# Patient Record
Sex: FEMALE | Race: WHITE | NOT HISPANIC OR LATINO | Employment: UNEMPLOYED | URBAN - METROPOLITAN AREA
[De-identification: names, ages, dates, MRNs, and addresses within clinical notes are randomized per-mention and may not be internally consistent; named-entity substitution may affect disease eponyms.]

---

## 2022-12-14 ENCOUNTER — SOCIAL WORK (OUTPATIENT)
Dept: ADMINISTRATIVE | Facility: OTHER | Age: 22
End: 2022-12-14

## 2022-12-14 NOTE — PROGRESS NOTES
SW met with pt regarding initial OB assessment. Pt stated this is her 4th pregnancy/0-miscarriage. Pt stated lives with her fiancee/children-4,3 and able to perform ADL's independently. Pt stated does not work. Pt stated support system is her bebeto/[employer]iel. Pt stated has medicaid([place]). Pt stated does not have WIC. Pt stated is going to breastfeed. SW provide pt with information on other community resources.SW faxed and scanned pt's notification of pregnancy into epic.  No other needs identified at this time.    Tova Snyder,MSW  Pager#[phone]

## 2023-01-01 PROBLEM — O26.893 HEARTBURN DURING PREGNANCY IN THIRD TRIMESTER: Status: ACTIVE | Noted: 2023-01-01

## 2023-01-01 PROBLEM — R12 HEARTBURN DURING PREGNANCY IN THIRD TRIMESTER: Status: ACTIVE | Noted: 2023-01-01

## 2023-01-01 PROBLEM — R10.9 ABDOMINAL PAIN AFFECTING PREGNANCY: Status: ACTIVE | Noted: 2023-01-01

## 2023-01-01 PROBLEM — O26.899 ABDOMINAL PAIN AFFECTING PREGNANCY: Status: ACTIVE | Noted: 2023-01-01

## 2023-01-04 PROBLEM — Z34.93 ENCOUNTER FOR SUPERVISION OF NORMAL PREGNANCY IN THIRD TRIMESTER: Status: ACTIVE | Noted: 2023-01-04

## 2023-01-04 PROBLEM — O99.810 HYPERGLYCEMIA IN PREGNANCY: Status: ACTIVE | Noted: 2023-01-04

## 2023-01-04 PROBLEM — D64.9 ANEMIA: Status: ACTIVE | Noted: 2023-01-04

## 2023-01-11 PROBLEM — O24.415 GESTATIONAL DIABETES MELLITUS (GDM) IN THIRD TRIMESTER CONTROLLED ON ORAL HYPOGLYCEMIC DRUG: Status: ACTIVE | Noted: 2023-01-11

## 2023-01-11 PROBLEM — Z87.59 HISTORY OF THIRD DEGREE PERINEAL LACERATION: Status: ACTIVE | Noted: 2023-01-11

## 2023-01-11 PROBLEM — Z87.59 HISTORY OF SHOULDER DYSTOCIA IN PRIOR PREGNANCY: Status: ACTIVE | Noted: 2023-01-11

## 2023-01-20 PROBLEM — O41.1230 CHORIOAMNIONITIS IN THIRD TRIMESTER: Status: ACTIVE | Noted: 2023-01-20

## 2023-01-21 PROBLEM — Z87.59 HISTORY OF SHOULDER DYSTOCIA IN PRIOR PREGNANCY: Status: RESOLVED | Noted: 2023-01-11 | Resolved: 2023-01-21

## 2023-01-21 PROBLEM — O41.1230 CHORIOAMNIONITIS IN THIRD TRIMESTER: Status: RESOLVED | Noted: 2023-01-20 | Resolved: 2023-01-21

## 2023-01-21 PROBLEM — O24.415 GESTATIONAL DIABETES MELLITUS (GDM) IN THIRD TRIMESTER CONTROLLED ON ORAL HYPOGLYCEMIC DRUG: Status: RESOLVED | Noted: 2023-01-11 | Resolved: 2023-01-21

## 2023-01-21 PROBLEM — Z87.59 HISTORY OF THIRD DEGREE PERINEAL LACERATION: Status: RESOLVED | Noted: 2023-01-11 | Resolved: 2023-01-21

## 2023-05-31 PROBLEM — N93.9 VAGINAL BLEEDING: Status: ACTIVE | Noted: 2023-05-31
